# Patient Record
Sex: MALE | Race: WHITE | URBAN - METROPOLITAN AREA
[De-identification: names, ages, dates, MRNs, and addresses within clinical notes are randomized per-mention and may not be internally consistent; named-entity substitution may affect disease eponyms.]

---

## 2018-03-15 ENCOUNTER — OFFICE VISIT (OUTPATIENT)
Dept: PRIMARY CARE CLINIC | Age: 33
End: 2018-03-15
Payer: COMMERCIAL

## 2018-03-15 VITALS
WEIGHT: 195 LBS | TEMPERATURE: 96.1 F | HEIGHT: 70 IN | DIASTOLIC BLOOD PRESSURE: 82 MMHG | BODY MASS INDEX: 27.92 KG/M2 | HEART RATE: 81 BPM | SYSTOLIC BLOOD PRESSURE: 120 MMHG | RESPIRATION RATE: 16 BRPM | OXYGEN SATURATION: 98 %

## 2018-03-15 DIAGNOSIS — J01.00 ACUTE NON-RECURRENT MAXILLARY SINUSITIS: Primary | ICD-10-CM

## 2018-03-15 DIAGNOSIS — J02.9 SORE THROAT: ICD-10-CM

## 2018-03-15 LAB — S PYO AG THROAT QL: NORMAL

## 2018-03-15 PROCEDURE — G8419 CALC BMI OUT NRM PARAM NOF/U: HCPCS | Performed by: PHYSICIAN ASSISTANT

## 2018-03-15 PROCEDURE — G8484 FLU IMMUNIZE NO ADMIN: HCPCS | Performed by: PHYSICIAN ASSISTANT

## 2018-03-15 PROCEDURE — 1036F TOBACCO NON-USER: CPT | Performed by: PHYSICIAN ASSISTANT

## 2018-03-15 PROCEDURE — 87880 STREP A ASSAY W/OPTIC: CPT | Performed by: PHYSICIAN ASSISTANT

## 2018-03-15 PROCEDURE — G8427 DOCREV CUR MEDS BY ELIG CLIN: HCPCS | Performed by: PHYSICIAN ASSISTANT

## 2018-03-15 PROCEDURE — 99213 OFFICE O/P EST LOW 20 MIN: CPT | Performed by: PHYSICIAN ASSISTANT

## 2018-03-15 RX ORDER — CEFDINIR 300 MG/1
300 CAPSULE ORAL 2 TIMES DAILY
Qty: 20 CAPSULE | Refills: 0 | Status: SHIPPED | OUTPATIENT
Start: 2018-03-15 | End: 2018-03-25

## 2018-03-15 ASSESSMENT — ENCOUNTER SYMPTOMS
HOARSE VOICE: 0
SORE THROAT: 0
SINUS PRESSURE: 1
COUGH: 1
SWOLLEN GLANDS: 0
SHORTNESS OF BREATH: 0

## 2018-03-15 NOTE — PROGRESS NOTES
Subjective     Hessie Double 28 y.o. male presents 3/15/18 with   Chief Complaint   Patient presents with    URI     x1 week pt has c.o cough, congestion, sinus pressure, drainage, sweats, headache, sore throat, chills, and ear pain. Sinusitis   This is a new problem. The current episode started in the past 7 days. The problem has been gradually worsening since onset. There has been no fever. Associated symptoms include congestion, coughing, headaches and sinus pressure. Pertinent negatives include no chills, diaphoresis, ear pain, hoarse voice, neck pain, shortness of breath, sneezing, sore throat or swollen glands. Treatments tried: Mucinex, Sudafed. The treatment provided no relief. Reviewed the following history:    No past medical history on file. No past surgical history on file. No family history on file. No Known Allergies    Current Outpatient Prescriptions   Medication Sig Dispense Refill    cefdinir (OMNICEF) 300 MG capsule Take 1 capsule by mouth 2 times daily for 10 days 20 capsule 0     No current facility-administered medications for this visit. Review of Systems   Constitutional: Negative for chills and diaphoresis. HENT: Positive for congestion and sinus pressure. Negative for ear pain, hoarse voice, sneezing and sore throat. Respiratory: Positive for cough. Negative for shortness of breath. Musculoskeletal: Negative for neck pain. Neurological: Positive for headaches. Objective    Vitals:    03/15/18 1341   BP: 120/82   Pulse: 81   Resp: 16   Temp: 96.1 °F (35.6 °C)   TempSrc: Tympanic   SpO2: 98%   Weight: 195 lb (88.5 kg)   Height: 5' 9.5\" (1.765 m)       Physical Exam   Constitutional: He appears well-developed and well-nourished. No distress. HENT:   Head: Normocephalic and atraumatic. Right Ear: External ear and ear canal normal. Tympanic membrane is not erythematous.    Left Ear: External ear and ear canal normal. Tympanic membrane is not the computerized patient record. Return if symptoms worsen or fail to improve, for follow up with PCP.     ERIKA Avilez

## 2018-03-15 NOTE — PATIENT INSTRUCTIONS
hot, wet towel or a warm gel pack on your face 3 or 4 times a day for 5 to 10 minutes each time. · Try a decongestant nasal spray like oxymetazoline (Afrin). Do not use it for more than 3 days in a row. Using it for more than 3 days can make your congestion worse. When should you call for help? Call your doctor now or seek immediate medical care if:  ? · You have new or worse swelling or redness in your face or around your eyes. ? · You have a new or higher fever. ? Watch closely for changes in your health, and be sure to contact your doctor if:  ? · You have new or worse facial pain. ? · The mucus from your nose becomes thicker (like pus) or has new blood in it. ? · You are not getting better as expected. Where can you learn more? Go to https://[email or web address] org and sign in to your [employer] account. Enter Q810 in the Ry[employer] box to learn more about \"Sinusitis: Care Instructions. \"     If you do not have an account, please click on the \"Sign Up Now\" link. Current as of: May 12, 2017  Content Version: 11.5  © 5833-8998 [employer]. Care instructions adapted under license by [facility] ([facility]). If you have questions about a medical condition or this instruction, always ask your healthcare professional. [address]  any warranty or liability for your use of this information.